# Patient Record
Sex: FEMALE | Race: WHITE | NOT HISPANIC OR LATINO | Employment: PART TIME | URBAN - METROPOLITAN AREA
[De-identification: names, ages, dates, MRNs, and addresses within clinical notes are randomized per-mention and may not be internally consistent; named-entity substitution may affect disease eponyms.]

---

## 2019-06-04 ENCOUNTER — OFFICE VISIT (OUTPATIENT)
Dept: URGENT CARE | Facility: CLINIC | Age: 49
End: 2019-06-04
Payer: COMMERCIAL

## 2019-06-04 VITALS
HEIGHT: 70 IN | OXYGEN SATURATION: 98 % | SYSTOLIC BLOOD PRESSURE: 143 MMHG | HEART RATE: 83 BPM | RESPIRATION RATE: 16 BRPM | TEMPERATURE: 99.3 F | DIASTOLIC BLOOD PRESSURE: 87 MMHG | WEIGHT: 210 LBS | BODY MASS INDEX: 30.06 KG/M2

## 2019-06-04 DIAGNOSIS — S61.211A LACERATION OF LEFT INDEX FINGER WITHOUT FOREIGN BODY WITHOUT DAMAGE TO NAIL, INITIAL ENCOUNTER: Primary | ICD-10-CM

## 2019-06-04 PROCEDURE — 90471 IMMUNIZATION ADMIN: CPT | Performed by: PHYSICIAN ASSISTANT

## 2019-06-04 PROCEDURE — 90715 TDAP VACCINE 7 YRS/> IM: CPT

## 2019-06-04 PROCEDURE — 99203 OFFICE O/P NEW LOW 30 MIN: CPT | Performed by: PHYSICIAN ASSISTANT

## 2019-06-04 PROCEDURE — 12001 RPR S/N/AX/GEN/TRNK 2.5CM/<: CPT | Performed by: PHYSICIAN ASSISTANT

## 2019-06-04 PROCEDURE — 90715 TDAP VACCINE 7 YRS/> IM: CPT | Performed by: PHYSICIAN ASSISTANT

## 2019-06-04 RX ORDER — SERTRALINE HYDROCHLORIDE 100 MG/1
100 TABLET, FILM COATED ORAL DAILY
Refills: 3 | COMMUNITY
Start: 2019-05-01

## 2019-06-04 RX ORDER — ALPRAZOLAM 0.5 MG/1
TABLET ORAL
Refills: 0 | COMMUNITY
Start: 2019-03-05

## 2019-06-04 RX ORDER — LIDOCAINE HYDROCHLORIDE 20 MG/ML
5 INJECTION, SOLUTION EPIDURAL; INFILTRATION; INTRACAUDAL; PERINEURAL ONCE
Status: COMPLETED | OUTPATIENT
Start: 2019-06-04 | End: 2019-06-04

## 2019-06-04 RX ORDER — OMEPRAZOLE 40 MG/1
CAPSULE, DELAYED RELEASE ORAL
COMMUNITY
Start: 2019-05-31

## 2019-06-04 RX ADMIN — LIDOCAINE HYDROCHLORIDE 5 ML: 20 INJECTION, SOLUTION EPIDURAL; INFILTRATION; INTRACAUDAL; PERINEURAL at 18:16

## 2019-06-18 ENCOUNTER — OFFICE VISIT (OUTPATIENT)
Dept: URGENT CARE | Facility: CLINIC | Age: 49
End: 2019-06-18
Payer: COMMERCIAL

## 2019-06-18 VITALS
DIASTOLIC BLOOD PRESSURE: 80 MMHG | WEIGHT: 210 LBS | BODY MASS INDEX: 30.06 KG/M2 | TEMPERATURE: 99.4 F | HEIGHT: 70 IN | HEART RATE: 78 BPM | RESPIRATION RATE: 18 BRPM | OXYGEN SATURATION: 100 % | SYSTOLIC BLOOD PRESSURE: 131 MMHG

## 2019-06-18 DIAGNOSIS — Z48.02 ENCOUNTER FOR REMOVAL OF SUTURES: Primary | ICD-10-CM

## 2019-06-18 PROCEDURE — 99211 OFF/OP EST MAY X REQ PHY/QHP: CPT | Performed by: PHYSICIAN ASSISTANT

## 2019-10-20 ENCOUNTER — OFFICE VISIT (OUTPATIENT)
Dept: URGENT CARE | Facility: CLINIC | Age: 49
End: 2019-10-20
Payer: COMMERCIAL

## 2019-10-20 ENCOUNTER — APPOINTMENT (OUTPATIENT)
Dept: RADIOLOGY | Facility: CLINIC | Age: 49
End: 2019-10-20
Payer: COMMERCIAL

## 2019-10-20 VITALS
RESPIRATION RATE: 16 BRPM | SYSTOLIC BLOOD PRESSURE: 130 MMHG | BODY MASS INDEX: 30.61 KG/M2 | TEMPERATURE: 99.1 F | DIASTOLIC BLOOD PRESSURE: 98 MMHG | OXYGEN SATURATION: 98 % | HEIGHT: 70 IN | HEART RATE: 89 BPM | WEIGHT: 213.85 LBS

## 2019-10-20 DIAGNOSIS — R07.81 RIB PAIN: ICD-10-CM

## 2019-10-20 DIAGNOSIS — R07.81 RIB PAIN: Primary | ICD-10-CM

## 2019-10-20 DIAGNOSIS — S99.911A INJURY OF RIGHT ANKLE, INITIAL ENCOUNTER: ICD-10-CM

## 2019-10-20 PROCEDURE — 71101 X-RAY EXAM UNILAT RIBS/CHEST: CPT

## 2019-10-20 PROCEDURE — 73610 X-RAY EXAM OF ANKLE: CPT

## 2019-10-20 PROCEDURE — 99213 OFFICE O/P EST LOW 20 MIN: CPT | Performed by: PHYSICIAN ASSISTANT

## 2019-10-20 RX ORDER — ELASTIC BANDAGE 3"
BANDAGE TOPICAL
Qty: 1 EACH | Refills: 0 | Status: SHIPPED | COMMUNITY
Start: 2019-10-20

## 2019-10-20 RX ORDER — LISINOPRIL 10 MG/1
10 TABLET ORAL DAILY
COMMUNITY

## 2019-10-20 NOTE — PATIENT INSTRUCTIONS
Tylenol/Ibuprofen for pain  Gentle deep breathing  Ice 20 minutes 3-4 times per day for 3 days  Wear splint or ACE wrap +/- crutches for support  Rest and Elevate foot/ankle  Follow up with orthopedic if symptoms do not improve  Follow up with PCP in 3-5 days  Go to ED if symptoms worsen  Ankle Sprain   WHAT YOU NEED TO KNOW:   An ankle sprain happens when 1 or more ligaments in your ankle joint stretch or tear  Ligaments are tough tissues that connect bones  Ligaments support your joints and keep your bones in place  DISCHARGE INSTRUCTIONS:   Return to the emergency department if:   · You have severe pain in your ankle  · Your foot or toes are cold or numb  · Your ankle becomes more weak or unstable (wobbly)  · You are unable to put any weight on your ankle or foot  · Your swelling has increased or returned  Contact your healthcare provider if:   · Your pain does not go away, even after treatment  · You have questions or concerns about your condition or care  Medicines: You may need any of the following:  · NSAIDs , such as ibuprofen, help decrease swelling, pain, and fever  This medicine is available with or without a doctor's order  NSAIDs can cause stomach bleeding or kidney problems in certain people  If you take blood thinner medicine, always ask your healthcare provider if NSAIDs are safe for you  Always read the medicine label and follow directions  · Acetaminophen  decreases pain  It is available without a doctor's order  Ask how much to take and how often to take it  Follow directions  Acetaminophen can cause liver damage if not taken correctly  · Prescription pain medicine  may be given  Ask how to take this medicine safely  · Take your medicine as directed  Contact your healthcare provider if you think your medicine is not helping or if you have side effects  Tell him or her if you are allergic to any medicine  Keep a list of the medicines, vitamins, and herbs you take  Include the amounts, and when and why you take them  Bring the list or the pill bottles to follow-up visits  Carry your medicine list with you in case of an emergency  Self care:   · Use support devices,  such as a brace, cast, or splint, may be needed to limit your movement and protect your joint  You may need to use crutches to decrease your pain as you move around  · Go to physical therapy as directed  A physical therapist teaches you exercises to help improve movement and strength, and to decrease pain  · Rest  your ankle so that it can heal  Return to normal activities as directed  · Apply ice on your ankle for 15 to 20 minutes every hour or as directed  Use an ice pack, or put crushed ice in a plastic bag  Cover it with a towel  Ice helps prevent tissue damage and decreases swelling and pain  · Compress  your ankle  Ask if you should wrap an elastic bandage around your injured ligament  An elastic bandage provides support and helps decrease swelling and movement so your joint can heal  Wear as long as directed  · Elevate  your ankle above the level of your heart as often as you can  This will help decrease swelling and pain  Prop your ankle on pillows or blankets to keep it elevated comfortably  Prevent another ankle sprain:   · Let your ankle heal   Find out how long your ligament needs to heal  Do not do any physical activity until your healthcare provider says it is okay  If you start activity too soon, you may develop a more serious injury  · Always warm up and stretch  before you exercise or play sports  · Use the right equipment  Always wear shoes that fit well and are made for the activity that you are doing  You may also need ankle supports, elbow and knee pads, or braces  Follow up with your healthcare provider as directed:  Write down your questions so you remember to ask them during your visits     © 2017 [facility] Information is for End User's use only and may not be sold, redistributed or otherwise used for commercial purposes  All illustrations and images included in CareNotes® are the copyrighted property of [employer]  or [employer]allon  The above information is an  only  It is not intended as medical advice for individual conditions or treatments  Talk to your doctor, nurse or pharmacist before following any medical regimen to see if it is safe and effective for you  Rib Contusion   WHAT YOU NEED TO KNOW:   A rib contusion is a bruise on one or more of your ribs  DISCHARGE INSTRUCTIONS:   Return to the emergency department if:   · You have increased chest pain  · You have shortness of breath  · You start to cough up blood  · Your pain does not improve with pain medicine  Contact your healthcare provider if:   · You have a cough  · You have a fever  · You have questions or concerns about your condition or care  Medicines: You may need any of the following:  · NSAIDs , such as ibuprofen, help decrease swelling, pain, and fever  This medicine is available with or without a doctor's order  NSAIDs can cause stomach bleeding or kidney problems in certain people  If you take blood thinner medicine, always ask if NSAIDs are safe for you  Always read the medicine label and follow directions  Do not give these medicines to children under 10months of age without direction from your child's healthcare provider  · Prescription pain medicine  may be given  Ask how to take this medicine safely  · Take your medicine as directed  Contact your healthcare provider if you think your medicine is not helping or if you have side effects  Tell him of her if you are allergic to any medicine  Keep a list of the medicines, vitamins, and herbs you take  Include the amounts, and when and why you take them  Bring the list or the pill bottles to follow-up visits   Carry your medicine list with you in case of an emergency  Deep breathing:   · To help prevent pneumonia, take 10 deep breaths every hour, even when you wake up during the night  Brace your ribs with your hands or a pillow while you take deep breaths or cough  This will help decrease your pain  · You may need to use an incentive spirometer to help you take deeper breaths  Put the plastic piece into your mouth and take a very deep breath  Hold your breath as long as you can  Then let out your breath  Do this 10 times in a row every hour while you are awake  Rest:  Rest your ribs to decrease swelling and allow the injury to heal faster  Avoid activities that may cause more pain or damage to your ribs  As your pain decreases, begin movements slowly  Ice:  Ice helps decrease swelling and pain  Ice may also help prevent tissue damage  Use an ice pack or put crushed ice in a plastic bag  Cover it with a towel and place it on your bruised area for 15 to 20 minutes every hour as directed  Follow up with your healthcare provider as directed:  Write down your questions so you remember to ask them during your visits  © 2017 [address]  Information is for End User's use only and may not be sold, redistributed or otherwise used for commercial purposes  All illustrations and images included in CareNotes® are the copyrighted property of A [employer]  or Amrit Magallon  The above information is an  only  It is not intended as medical advice for individual conditions or treatments  Talk to your doctor, nurse or pharmacist before following any medical regimen to see if it is safe and effective for you

## 2019-10-20 NOTE — PROGRESS NOTES
[facility] [facility]        NAME: Lux Espitia is a 51 y o  female  : 1970    MRN: [id number]  DATE: 2019  TIME: 4:09 PM    Assessment and Plan   Rib pain [R07 81]  1  Rib pain  CANCELED: XR ribs 2 vw left   2  Injury of right ankle, initial encounter  XR ankle 3+ vw right    [facility]    Ambulatory referral to Orthopedic Surgery     XR: no fracture or dislocation  ACE bandage applied to R ankle in office  Patient Instructions     Tylenol/Ibuprofen for pain  Gentle deep breathing  Ice 20 minutes 3-4 times per day for 3 days  Wear splint or ACE wrap +/- crutches for support  Rest and Elevate foot/ankle  Follow up with orthopedic if symptoms do not improve  Follow up with PCP in 3-5 days  Go to ED if symptoms worsen  Chief Complaint     Chief Complaint   Patient presents with    Fall     left sided rib pain and right ankle pain after tripping on uneven sidewalk  History of Present Illness       Fall   Incident onset: yesterday  Fall occurred: sidewalk while standing  Point of impact: L rib  Pain location: L rib and R ankle  The pain is moderate  The symptoms are aggravated by movement (deep breathing)  Pertinent negatives include no abdominal pain, loss of consciousness or numbness  She has tried nothing for the symptoms  Review of Systems   Review of Systems   Respiratory: Negative for shortness of breath  Cardiovascular: Positive for chest pain (rib L inferior)  Gastrointestinal: Negative for abdominal pain  Musculoskeletal: Negative for arthralgias, gait problem and joint swelling  Skin: Negative for color change  Neurological: Negative for loss of consciousness, weakness and numbness           Current Medications       Current Outpatient Medications:     ALPRAZolam (XANAX) 0 5 mg tablet, TAKE 1/2 - 1 TABLET BY MOUTH TWICE A DAY AS NEEDED FOR ANXIETY, Disp: , Rfl: 0    lisinopril (ZESTRIL) 10 mg tablet, Take 10 mg by mouth daily, Disp: , Rfl:     omeprazole (PriLOSEC) 40 MG capsule, , Disp: , Rfl:     sertraline (ZOLOFT) 100 mg tablet, Take 100 mg by mouth daily, Disp: , Rfl: 3    Elastic Bandages & Supports (ACE BANDAGE SELF-ADHERING) MISC, Placed over R foot and ankle , Disp: 1 each, Rfl: 0    sertraline (ZOLOFT) 50 mg tablet, , Disp: , Rfl:     Current Allergies     Allergies as of 10/20/2019 - Reviewed 2019   Allergen Reaction Noted    Erythromycin Nausea Only 2019            The following portions of the patient's history were reviewed and updated as appropriate: allergies, current medications, past family history, past medical history, past social history, past surgical history and problem list      Past Medical History:   Diagnosis Date    Anxiety     GERD (gastroesophageal reflux disease)     Hypertension        Past Surgical History:   Procedure Laterality Date    ANTERIOR CRUCIATE LIGAMENT REPAIR       SECTION      MENISCECTOMY         Family History   Problem Relation Age of Onset    Hypertension Mother     Heart disease Father     Hypertension Father     Diabetes Father          Medications have been verified  Objective   /98   Pulse 89   Temp 99 1 °F (37 3 °C) (Tympanic)   Resp 16   Ht 5' 10" (1 778 m)   Wt 97 kg (213 lb 13 5 oz)   SpO2 98%   BMI 30 68 kg/m²        Physical Exam     Physical Exam   Constitutional: She is oriented to person, place, and time  She appears well-developed and well-nourished  No distress  HENT:   Head: Normocephalic and atraumatic  Right Ear: External ear normal    Left Ear: External ear normal    Eyes: Pupils are equal, round, and reactive to light  EOM are normal    Neck: Normal range of motion  Cardiovascular: Normal rate, regular rhythm, normal heart sounds and intact distal pulses  Exam reveals no gallop and no friction rub  No murmur heard    Pulmonary/Chest: Effort normal and breath sounds normal  No respiratory distress  She has no wheezes  She has no rales  She exhibits tenderness (L anterior aspect of inferior ribs)  Abdominal: Soft  There is no tenderness  Musculoskeletal: Normal range of motion  She exhibits edema and tenderness  She exhibits no deformity  R lateral malleolus edematous and ttp  Neurological: She is alert and oriented to person, place, and time  She has normal reflexes  She displays normal reflexes  No cranial nerve deficit or sensory deficit  Coordination normal    Skin: Skin is warm  No rash noted  No erythema  Psychiatric: She has a normal mood and affect  Her behavior is normal  Judgment and thought content normal    Vitals reviewed

## 2025-05-19 ENCOUNTER — APPOINTMENT (EMERGENCY)
Dept: CT IMAGING | Facility: HOSPITAL | Age: 55
End: 2025-05-19
Payer: COMMERCIAL

## 2025-05-19 ENCOUNTER — APPOINTMENT (EMERGENCY)
Dept: RADIOLOGY | Facility: HOSPITAL | Age: 55
End: 2025-05-19
Payer: COMMERCIAL

## 2025-05-19 ENCOUNTER — HOSPITAL ENCOUNTER (EMERGENCY)
Facility: HOSPITAL | Age: 55
Discharge: HOME | End: 2025-05-19
Attending: EMERGENCY MEDICINE
Payer: COMMERCIAL

## 2025-05-19 VITALS
BODY MASS INDEX: 33.93 KG/M2 | RESPIRATION RATE: 20 BRPM | SYSTOLIC BLOOD PRESSURE: 156 MMHG | TEMPERATURE: 98.9 F | HEIGHT: 70 IN | OXYGEN SATURATION: 98 % | DIASTOLIC BLOOD PRESSURE: 70 MMHG | HEART RATE: 70 BPM | WEIGHT: 236.99 LBS

## 2025-05-19 DIAGNOSIS — I16.0 HYPERTENSIVE URGENCY: ICD-10-CM

## 2025-05-19 DIAGNOSIS — J32.9 SINUSITIS: Primary | ICD-10-CM

## 2025-05-19 LAB
ALBUMIN SERPL BCG-MCNC: 4.6 G/DL (ref 3.5–5)
ALP SERPL-CCNC: 71 U/L (ref 34–104)
ALT SERPL W P-5'-P-CCNC: 29 U/L (ref 7–52)
ANION GAP SERPL CALCULATED.3IONS-SCNC: 7 MMOL/L (ref 4–13)
AST SERPL W P-5'-P-CCNC: 17 U/L (ref 13–39)
BASOPHILS # BLD AUTO: 0.06 THOUSANDS/ÂΜL (ref 0–0.1)
BASOPHILS NFR BLD AUTO: 1 % (ref 0–1)
BILIRUB SERPL-MCNC: 0.4 MG/DL (ref 0.2–1)
BUN SERPL-MCNC: 15 MG/DL (ref 5–25)
CALCIUM SERPL-MCNC: 10 MG/DL (ref 8.4–10.2)
CARDIAC TROPONIN I PNL SERPL HS: 5 NG/L (ref ?–50)
CHLORIDE SERPL-SCNC: 105 MMOL/L (ref 96–108)
CO2 SERPL-SCNC: 28 MMOL/L (ref 21–32)
CREAT SERPL-MCNC: 0.72 MG/DL (ref 0.6–1.3)
EOSINOPHIL # BLD AUTO: 0.24 THOUSAND/ÂΜL (ref 0–0.61)
EOSINOPHIL NFR BLD AUTO: 2 % (ref 0–6)
ERYTHROCYTE [DISTWIDTH] IN BLOOD BY AUTOMATED COUNT: 13.2 % (ref 11.6–15.1)
GFR SERPL CREATININE-BSD FRML MDRD: 95 ML/MIN/1.73SQ M
GLUCOSE SERPL-MCNC: 115 MG/DL (ref 65–140)
HCT VFR BLD AUTO: 46.3 % (ref 34.8–46.1)
HGB BLD-MCNC: 15.3 G/DL (ref 11.5–15.4)
IMM GRANULOCYTES # BLD AUTO: 0.02 THOUSAND/UL (ref 0–0.2)
IMM GRANULOCYTES NFR BLD AUTO: 0 % (ref 0–2)
LYMPHOCYTES # BLD AUTO: 2.3 THOUSANDS/ÂΜL (ref 0.6–4.47)
LYMPHOCYTES NFR BLD AUTO: 23 % (ref 14–44)
MAGNESIUM SERPL-MCNC: 2.2 MG/DL (ref 1.9–2.7)
MCH RBC QN AUTO: 28.1 PG (ref 26.8–34.3)
MCHC RBC AUTO-ENTMCNC: 33 G/DL (ref 31.4–37.4)
MCV RBC AUTO: 85 FL (ref 82–98)
MONOCYTES # BLD AUTO: 0.47 THOUSAND/ÂΜL (ref 0.17–1.22)
MONOCYTES NFR BLD AUTO: 5 % (ref 4–12)
NEUTROPHILS # BLD AUTO: 6.8 THOUSANDS/ÂΜL (ref 1.85–7.62)
NEUTS SEG NFR BLD AUTO: 69 % (ref 43–75)
NRBC BLD AUTO-RTO: 0 /100 WBCS
PLATELET # BLD AUTO: 295 THOUSANDS/UL (ref 149–390)
PMV BLD AUTO: 10.1 FL (ref 8.9–12.7)
POTASSIUM SERPL-SCNC: 3.7 MMOL/L (ref 3.5–5.3)
PROT SERPL-MCNC: 7.6 G/DL (ref 6.4–8.4)
RBC # BLD AUTO: 5.44 MILLION/UL (ref 3.81–5.12)
SODIUM SERPL-SCNC: 140 MMOL/L (ref 135–147)
WBC # BLD AUTO: 9.89 THOUSAND/UL (ref 4.31–10.16)

## 2025-05-19 PROCEDURE — 96361 HYDRATE IV INFUSION ADD-ON: CPT

## 2025-05-19 PROCEDURE — 93005 ELECTROCARDIOGRAM TRACING: CPT

## 2025-05-19 PROCEDURE — 84484 ASSAY OF TROPONIN QUANT: CPT | Performed by: PHYSICIAN ASSISTANT

## 2025-05-19 PROCEDURE — 83735 ASSAY OF MAGNESIUM: CPT | Performed by: PHYSICIAN ASSISTANT

## 2025-05-19 PROCEDURE — 70450 CT HEAD/BRAIN W/O DYE: CPT

## 2025-05-19 PROCEDURE — 99284 EMERGENCY DEPT VISIT MOD MDM: CPT

## 2025-05-19 PROCEDURE — 36415 COLL VENOUS BLD VENIPUNCTURE: CPT | Performed by: PHYSICIAN ASSISTANT

## 2025-05-19 PROCEDURE — 80053 COMPREHEN METABOLIC PANEL: CPT | Performed by: PHYSICIAN ASSISTANT

## 2025-05-19 PROCEDURE — 85025 COMPLETE CBC W/AUTO DIFF WBC: CPT | Performed by: PHYSICIAN ASSISTANT

## 2025-05-19 PROCEDURE — 99285 EMERGENCY DEPT VISIT HI MDM: CPT | Performed by: PHYSICIAN ASSISTANT

## 2025-05-19 PROCEDURE — 96374 THER/PROPH/DIAG INJ IV PUSH: CPT

## 2025-05-19 PROCEDURE — 71045 X-RAY EXAM CHEST 1 VIEW: CPT

## 2025-05-19 RX ORDER — CLONIDINE HYDROCHLORIDE 0.1 MG/1
0.2 TABLET ORAL ONCE
Status: COMPLETED | OUTPATIENT
Start: 2025-05-19 | End: 2025-05-19

## 2025-05-19 RX ORDER — NALTREXONE HYDROCHLORIDE AND BUPROPION HYDROCHLORIDE 8; 90 MG/1; MG/1
TABLET, EXTENDED RELEASE ORAL
COMMUNITY

## 2025-05-19 RX ORDER — FENOFIBRATE 48 MG/1
45 TABLET, FILM COATED ORAL DAILY
COMMUNITY

## 2025-05-19 RX ORDER — LABETALOL HYDROCHLORIDE 5 MG/ML
10 INJECTION, SOLUTION INTRAVENOUS ONCE
Status: COMPLETED | OUTPATIENT
Start: 2025-05-19 | End: 2025-05-19

## 2025-05-19 RX ORDER — CETIRIZINE HYDROCHLORIDE 10 MG/1
TABLET, CHEWABLE ORAL
COMMUNITY
Start: 2025-04-28

## 2025-05-19 RX ORDER — LOSARTAN POTASSIUM 100 MG/1
100 TABLET ORAL
COMMUNITY
Start: 2025-04-28

## 2025-05-19 RX ORDER — METOPROLOL SUCCINATE 25 MG/1
25 TABLET, EXTENDED RELEASE ORAL DAILY
Qty: 20 TABLET | Refills: 0 | Status: SHIPPED | OUTPATIENT
Start: 2025-05-19

## 2025-05-19 RX ADMIN — CLONIDINE HYDROCHLORIDE 0.2 MG: 0.1 TABLET ORAL at 20:14

## 2025-05-19 RX ADMIN — SODIUM CHLORIDE 1000 ML: 0.9 INJECTION, SOLUTION INTRAVENOUS at 20:14

## 2025-05-19 RX ADMIN — LABETALOL HYDROCHLORIDE 10 MG: 5 INJECTION, SOLUTION INTRAVENOUS at 20:16

## 2025-05-19 RX ADMIN — AMOXICILLIN AND CLAVULANATE POTASSIUM 1 TABLET: 875; 125 TABLET, FILM COATED ORAL at 21:24

## 2025-05-20 LAB
ATRIAL RATE: 88 BPM
P AXIS: 56 DEGREES
PR INTERVAL: 188 MS
QRS AXIS: 18 DEGREES
QRSD INTERVAL: 102 MS
QT INTERVAL: 360 MS
QTC INTERVAL: 435 MS
T WAVE AXIS: 30 DEGREES
VENTRICULAR RATE: 88 BPM

## 2025-05-20 PROCEDURE — 93010 ELECTROCARDIOGRAM REPORT: CPT | Performed by: INTERNAL MEDICINE

## 2025-05-20 NOTE — DISCHARGE INSTRUCTIONS
This high blood pressure episode may be secondary or exacerbated by your new medication Contrave    These follow-up with PCP before stopping and start the medications in the meantime.

## 2025-05-20 NOTE — ED PROVIDER NOTES
"Time reflects when diagnosis was documented in both MDM as applicable and the Disposition within this note       Time User Action Codes Description Comment    5/19/2025  9:14 PM Tano Quezada [J32.9] Sinusitis     5/19/2025  9:14 PM Tano Quezada [I16.0] Hypertensive urgency           ED Disposition       ED Disposition   Discharge    Condition   Stable    Date/Time   Mon May 19, 2025  9:15 PM    Comment   Hafsa Hankins discharge to home/self care.                   Assessment & Plan       Medical Decision Making  The patient is a 55-year-old female with a past medical history of hypertension who presents with high blood pressure.  She states that her PCP placed her on concaved which i is a weight loss medication that is known to elevate blood pressure.  She states that now she is on losartan 100 mg once a day.  She states that overall she is feeling okay.  She states today she felt as though she was \"lightheaded\" and \"anxious\" and checked her pressures and it was elevated in the 200s.  The patient denies any current headache blurred vision vomiting chest pain shortness of breath.  Has been compliant with her blood pressure medications.    Patient's blood work was unremarkable imaging studies noted sinusitis.  Will treat.  Blood pressure did improve.  Will start small dose of metoprolol will follow-up with PCP regarding her Contrave     Amount and/or Complexity of Data Reviewed  Labs: ordered. Decision-making details documented in ED Course.  Radiology: ordered and independent interpretation performed. Decision-making details documented in ED Course.  ECG/medicine tests: ordered and independent interpretation performed. Decision-making details documented in ED Course.    Risk  Prescription drug management.        ED Course as of 05/19/25 2301   Mon May 19, 2025   2047 Blood Pressure(!): 197/103   2047 Blood Pressure(!): 181/81       Medications   sodium chloride 0.9 % bolus 1,000 mL (0 mL Intravenous " "Stopped 25)   cloNIDine (CATAPRES) tablet 0.2 mg (0.2 mg Oral Given 25)   labetalol (NORMODYNE) injection 10 mg (10 mg Intravenous Given 25)   amoxicillin-clavulanate (AUGMENTIN) 875-125 mg per tablet 1 tablet (1 tablet Oral Given 25)       ED Risk Strat Scores                    No data recorded                            History of Present Illness       Chief Complaint   Patient presents with    High Blood Pressure     Pt states that she just started taking a weight loss medication 3 weeks ago. Pt states that her blood pressure is 200/110. PT denies any symptoms.        Past Medical History:   Diagnosis Date    Anxiety     GERD (gastroesophageal reflux disease)     Hypertension       Past Surgical History:   Procedure Laterality Date    ANTERIOR CRUCIATE LIGAMENT REPAIR       SECTION      MENISCECTOMY        Family History   Problem Relation Age of Onset    Hypertension Mother     Heart disease Father     Hypertension Father     Diabetes Father       Social History[1]   E-Cigarette/Vaping    E-Cigarette Use Never User       E-Cigarette/Vaping Substances      I have reviewed and agree with the history as documented.     The patient is a 55-year-old female with a past medical history of hypertension who presents with high blood pressure.  She states that her PCP placed her on concaved which i is a weight loss medication that is known to elevate blood pressure.  She states that now she is on losartan 100 mg once a day.  She states that overall she is feeling okay.  She states today she felt as though she was \"lightheaded\" and \"anxious\" and checked her pressures and it was elevated in the 200s.  The patient denies any current headache blurred vision vomiting chest pain shortness of breath.  Has been compliant with her blood pressure medications.          Review of Systems   All other systems reviewed and are negative.          Objective       ED Triage Vitals [25 " 1934]   Temperature Pulse Blood Pressure Respirations SpO2 Patient Position - Orthostatic VS   98.9 °F (37.2 °C) 84 (!) 223/113 18 97 % Lying      Temp Source Heart Rate Source BP Location FiO2 (%) Pain Score    Temporal Monitor Left arm -- No Pain      Vitals      Date and Time Temp Pulse SpO2 Resp BP Pain Score FACES Pain Rating User   05/19/25 2115 -- 70 98 % 20 156/70 -- -- EM   05/19/25 2100 -- 72 93 % 17 159/74 -- -- EM   05/19/25 2030 -- 73 97 % 18 181/81 -- -- EM   05/19/25 2015 -- 91 95 % 20 197/103 -- -- EM   05/19/25 2012 -- 93 96 % 18 197/103 No Pain -- EM   05/19/25 1934 98.9 °F (37.2 °C) 84 97 % 18 223/113 No Pain -- SB            Physical Exam  Vitals and nursing note reviewed.   Constitutional:       General: She is in acute distress.      Appearance: She is well-developed.   HENT:      Head: Normocephalic and atraumatic.      Right Ear: External ear normal.      Left Ear: External ear normal.      Nose: Congestion present.     Eyes:      Extraocular Movements: Extraocular movements intact.      Pupils: Pupils are equal, round, and reactive to light.       Cardiovascular:      Rate and Rhythm: Normal rate and regular rhythm.      Heart sounds: No murmur heard.  Pulmonary:      Effort: Pulmonary effort is normal. No respiratory distress.      Breath sounds: Normal breath sounds. No wheezing.   Abdominal:      General: Bowel sounds are normal.      Palpations: Abdomen is soft. There is no mass.      Tenderness: There is no abdominal tenderness. There is no rebound.      Hernia: No hernia is present.     Musculoskeletal:      Cervical back: Normal range of motion and neck supple.     Skin:     General: Skin is warm and dry.      Capillary Refill: Capillary refill takes less than 2 seconds.     Neurological:      General: No focal deficit present.      Mental Status: She is alert and oriented to person, place, and time.      Coordination: Coordination normal.     Psychiatric:         Behavior: Behavior  normal.         Results Reviewed       Procedure Component Value Units Date/Time    HS Troponin 0hr (reflex protocol) [[id number]]  (Normal) Collected: 05/19/25 1950    Lab Status: Final result Specimen: Blood from Arm, Right Updated: 05/19/25 2020     hs TnI 0hr 5 ng/L     Comprehensive metabolic panel [[id number]] Collected: 05/19/25 1950    Lab Status: Final result Specimen: Blood from Arm, Right Updated: 05/19/25 2014     Sodium 140 mmol/L      Potassium 3.7 mmol/L      Chloride 105 mmol/L      CO2 28 mmol/L      ANION GAP 7 mmol/L      BUN 15 mg/dL      Creatinine 0.72 mg/dL      Glucose 115 mg/dL      Calcium 10.0 mg/dL      AST 17 U/L      ALT 29 U/L      Alkaline Phosphatase 71 U/L      Total Protein 7.6 g/dL      Albumin 4.6 g/dL      Total Bilirubin 0.40 mg/dL      eGFR 95 ml/min/1.73sq m     Narrative:      National Kidney Disease Foundation guidelines for Chronic Kidney Disease (CKD):     Stage 1 with normal or high GFR (GFR > 90 mL/min/1.73 square meters)    Stage 2 Mild CKD (GFR = 60-89 mL/min/1.73 square meters)    Stage 3A Moderate CKD (GFR = 45-59 mL/min/1.73 square meters)    Stage 3B Moderate CKD (GFR = 30-44 mL/min/1.73 square meters)    Stage 4 Severe CKD (GFR = 15-29 mL/min/1.73 square meters)    Stage 5 End Stage CKD (GFR <15 mL/min/1.73 square meters)  Note: GFR calculation is accurate only with a steady state creatinine    Magnesium [[id number]]  (Normal) Collected: 05/19/25 1950    Lab Status: Final result Specimen: Blood from Arm, Right Updated: 05/19/25 2014     Magnesium 2.2 mg/dL     CBC and differential [[id number]]  (Abnormal) Collected: 05/19/25 1950    Lab Status: Final result Specimen: Blood from Arm, Right Updated: 05/19/25 1954     WBC 9.89 Thousand/uL      RBC 5.44 Million/uL      Hemoglobin 15.3 g/dL      Hematocrit 46.3 %      MCV 85 fL      MCH 28.1 pg      MCHC 33.0 g/dL      RDW 13.2 %      MPV 10.1 fL      Platelets 295 Thousands/uL      nRBC 0 /100 WBCs      Segmented % 69 %       Immature Grans % 0 %      Lymphocytes % 23 %      Monocytes % 5 %      Eosinophils Relative 2 %      Basophils Relative 1 %      Absolute Neutrophils 6.80 Thousands/µL      Absolute Immature Grans 0.02 Thousand/uL      Absolute Lymphocytes 2.30 Thousands/µL      Absolute Monocytes 0.47 Thousand/µL      Eosinophils Absolute 0.24 Thousand/µL      Basophils Absolute 0.06 Thousands/µL             CT head without contrast   Final Interpretation by Brendan Pyle MD ([id number])      No acute intracranial abnormality.      Suspect mild acute sphenoid sinusitis. Recommend clinical correlation.            Workstation performed: [id number]         XR chest 1 view portable    (Results Pending)       Procedures    ED Medication and Procedure Management   Prior to Admission Medications   Prescriptions Last Dose Informant Patient Reported? Taking?   ALPRAZolam (XANAX) 0.5 mg tablet   Yes No   Sig: TAKE 1/2 - 1 TABLET BY MOUTH TWICE A DAY AS NEEDED FOR ANXIETY   Contrave 8-90 MG TB12   Yes No   Sig: PLEASE SEE ATTACHED FOR DETAILED DIRECTIONS   cetirizine (ZyrTEC) 10 MG chewable tablet   Yes Yes   Sig: Start: 25 8:41:00 AM EDT   fenofibrate (TRICOR) 48 mg tablet   Yes No   Sig: Take 45 mg by mouth daily   losartan (COZAAR) 100 MG tablet   Yes Yes   Si mg   sertraline (ZOLOFT) 50 mg tablet   Yes No      Facility-Administered Medications: None     Discharge Medication List as of 2025  9:16 PM        START taking these medications    Details   amoxicillin-clavulanate (AUGMENTIN) 875-125 mg per tablet Take 1 tablet by mouth every 12 (twelve) hours for 7 days, Starting 2025, Until 2025, Normal      metoprolol succinate (TOPROL-XL) 25 mg 24 hr tablet Take 1 tablet (25 mg total) by mouth daily, Starting Mon 2025, Normal           CONTINUE these medications which have NOT CHANGED    Details   cetirizine (ZyrTEC) 10 MG chewable tablet Start: 25 8:41:00 AM EDT, Historical Med      losartan  (COZAAR) 100 MG tablet 100 mg, Starting Mon 4/28/2025, Historical Med      ALPRAZolam (XANAX) 0.5 mg tablet TAKE 1/2 - 1 TABLET BY MOUTH TWICE A DAY AS NEEDED FOR ANXIETY, Historical Med      Contrave 8-90 MG TB12 PLEASE SEE ATTACHED FOR DETAILED DIRECTIONS, Historical Med      fenofibrate (TRICOR) 48 mg tablet Take 45 mg by mouth daily, Historical Med      sertraline (ZOLOFT) 50 mg tablet Starting Sat 6/1/2019, Historical Med           No discharge procedures on file.  ED SEPSIS DOCUMENTATION   Time reflects when diagnosis was documented in both MDM as applicable and the Disposition within this note       Time User Action Codes Description Comment    5/19/2025  9:14 PM Tano Quezada [J32.9] Sinusitis     5/19/2025  9:14 PM Tano Quezada [I16.0] Hypertensive urgency                    [1]   Social History  Tobacco Use    Smoking status: Every Day     Current packs/day: 1.00     Types: Cigarettes    Smokeless tobacco: Never   Vaping Use    Vaping status: Never Used   Substance Use Topics    Alcohol use: Not Currently    Drug use: Never        Tano Quezada PA-C  05/19/25 4343

## 2025-05-20 NOTE — ED NOTES
Reviewed discharge instructions with patient. NAD noted at time of discharge. Patient expressed understanding of discharge instructions. VSS. Ambulatory with steady gait at time of discharge.      Brie Pinto RN  05/19/25 0678